# Patient Record
Sex: FEMALE | Race: WHITE | NOT HISPANIC OR LATINO | Employment: STUDENT | URBAN - METROPOLITAN AREA
[De-identification: names, ages, dates, MRNs, and addresses within clinical notes are randomized per-mention and may not be internally consistent; named-entity substitution may affect disease eponyms.]

---
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
## 2024-01-16 DIAGNOSIS — Z30.011 ENCOUNTER FOR INITIAL PRESCRIPTION OF CONTRACEPTIVE PILLS: ICD-10-CM

## 2024-01-16 RX ORDER — NORGESTIMATE AND ETHINYL ESTRADIOL 7DAYSX3 28
1 KIT ORAL DAILY
Qty: 84 TABLET | Refills: 3 | Status: SHIPPED | OUTPATIENT
Start: 2024-01-16 | End: 2025-01-15

## 2024-05-15 ENCOUNTER — PATIENT MESSAGE (OUTPATIENT)
Dept: PRIMARY CARE | Facility: CLINIC | Age: 19
End: 2024-05-15
Payer: COMMERCIAL

## 2024-05-15 DIAGNOSIS — B35.1 NAIL FUNGUS: ICD-10-CM

## 2024-05-16 RX ORDER — CICLOPIROX 80 MG/ML
SOLUTION TOPICAL NIGHTLY
Qty: 6.6 ML | Refills: 2 | Status: SHIPPED | OUTPATIENT
Start: 2024-05-16 | End: 2024-11-12

## 2024-07-09 ENCOUNTER — OFFICE VISIT (OUTPATIENT)
Dept: PODIATRY | Facility: CLINIC | Age: 19
End: 2024-07-09
Payer: COMMERCIAL

## 2024-07-09 DIAGNOSIS — B35.1 ONYCHOMYCOSIS: Primary | ICD-10-CM

## 2024-07-09 PROCEDURE — 1036F TOBACCO NON-USER: CPT | Performed by: PODIATRIST

## 2024-07-09 PROCEDURE — 99203 OFFICE O/P NEW LOW 30 MIN: CPT | Performed by: PODIATRIST

## 2024-07-09 RX ORDER — TERBINAFINE HYDROCHLORIDE 250 MG/1
250 TABLET ORAL DAILY
Qty: 30 TABLET | Refills: 2 | Status: SHIPPED | OUTPATIENT
Start: 2024-07-09 | End: 2024-07-09

## 2024-07-09 RX ORDER — TERBINAFINE HYDROCHLORIDE 250 MG/1
250 TABLET ORAL DAILY
Qty: 30 TABLET | Refills: 2 | Status: SHIPPED | OUTPATIENT
Start: 2024-07-09 | End: 2024-10-01

## 2024-07-09 NOTE — PROGRESS NOTES
This is a 18 y.o. female new patient for nail changes    History of Present Illness:   Patient states they are here for toe nail concern  States she tried topical with no improvement  Denies trauma  No other pedal complaints    Past Medical History  No past medical history on file.    Medications and Allergies have been reviewed.    Review Of Systems:  GENERAL: No weight loss, malaise or fevers.  HEENT: Negative for frequent or significant headaches,   RESPIRATORY: Negative for cough, wheezing or shortness of breath.  CARDIOVASCULAR: Negative for chest pain, leg swelling or palpitations.    Physical Exam:  Patient is a pleasant, cooperative, well developed 18 y.o.  adult female. The patient is alert and oriented to time, place and person.   Patient has normal affect and mood.    Examination of Both Lower Extremities:   Objective:   Vasc: DP and PT pulses are palpable bilateral.  CFT is less than 3 seconds bilateral.  Skin temperature is warm to cool proximal to distal bilateral.      Neuro: Vibratory, light touch and proprioception are intact bilateral.      Derm: BL 5th nail note thickness and discolor. Skin is supple with normal texture and turgor noted.  Webspaces are clean, dry and intact bilateral.  There are no hyperkeratoses, ulcerations, verruca or other lesions noted.      Ortho: Muscle strength is 5/5 for all pedal groups tested.   1. Onychomycosis  terbinafine (LamISIL) 250 mg tablet        Patient exam and eval  Discussed oral antifungal  Discussed success rate  No other pedal concerns  Fu prn if no improvement noted  Patient was in agreement to this plan. All questions answered.      Valeria West DPM  [phone]  [facility]  Fax: [phone]

## 2024-07-22 ENCOUNTER — PATIENT MESSAGE (OUTPATIENT)
Dept: PRIMARY CARE | Facility: CLINIC | Age: 19
End: 2024-07-22
Payer: COMMERCIAL

## 2024-07-22 DIAGNOSIS — R13.10 DYSPHAGIA, UNSPECIFIED TYPE: Primary | ICD-10-CM

## 2024-08-20 ENCOUNTER — APPOINTMENT (OUTPATIENT)
Dept: PODIATRY | Facility: CLINIC | Age: 19
End: 2024-08-20
Payer: COMMERCIAL

## 2024-09-13 ENCOUNTER — APPOINTMENT (OUTPATIENT)
Dept: GASTROENTEROLOGY | Facility: CLINIC | Age: 19
End: 2024-09-13
Payer: COMMERCIAL

## 2024-09-13 VITALS — HEART RATE: 86 BPM | BODY MASS INDEX: 24.11 KG/M2 | WEIGHT: 150 LBS | HEIGHT: 66 IN

## 2024-09-13 DIAGNOSIS — R13.10 DYSPHAGIA, UNSPECIFIED TYPE: ICD-10-CM

## 2024-09-13 PROCEDURE — 1036F TOBACCO NON-USER: CPT | Performed by: STUDENT IN AN ORGANIZED HEALTH CARE EDUCATION/TRAINING PROGRAM

## 2024-09-13 PROCEDURE — 99203 OFFICE O/P NEW LOW 30 MIN: CPT | Performed by: STUDENT IN AN ORGANIZED HEALTH CARE EDUCATION/TRAINING PROGRAM

## 2024-09-13 PROCEDURE — 3008F BODY MASS INDEX DOCD: CPT | Performed by: STUDENT IN AN ORGANIZED HEALTH CARE EDUCATION/TRAINING PROGRAM

## 2024-09-13 RX ORDER — PANTOPRAZOLE SODIUM 40 MG/1
40 TABLET, DELAYED RELEASE ORAL DAILY
Qty: 30 TABLET | Refills: 11 | Status: SHIPPED | OUTPATIENT
Start: 2024-09-13 | End: 2025-09-13

## 2024-09-13 ASSESSMENT — ENCOUNTER SYMPTOMS
ROS GI COMMENTS: DYSPHAGIA
ABDOMINAL PAIN: 0
JOINT SWELLING: 0
LIGHT-HEADEDNESS: 0
COUGH: 0
FEVER: 0
SLEEP DISTURBANCE: 0
CHILLS: 0
CONFUSION: 0
ABDOMINAL DISTENTION: 0
DIZZINESS: 0
SPEECH DIFFICULTY: 0
TROUBLE SWALLOWING: 0
CONSTIPATION: 0
VOMITING: 0
NAUSEA: 0
WHEEZING: 0
DIARRHEA: 0
HEADACHES: 0
UNEXPECTED WEIGHT CHANGE: 0
COLOR CHANGE: 0
SHORTNESS OF BREATH: 0
DIFFICULTY URINATING: 0
ARTHRALGIAS: 0

## 2024-09-13 NOTE — PROGRESS NOTES
"Chief Complaint:  Chief Complaint   Patient presents with    Dysphagia       Here with mother for dysphagia for past 4-5 years.    Daily like feels like food is slow to go down, pressure. Needs to chug water for food to pass.    Intermittent need to vomit or cough up food, every couple of months    Talk to PMD who prescribed a pill for acid reflux and took for 2-3 weeks but did not help so stopped.     No family hx  No allergies or eczema     Dysphagia  Pertinent negatives include no abdominal pain, arthralgias, chest pain, chills, congestion, coughing, fever, headaches, joint swelling, nausea or vomiting.         Review of Systems   Constitutional:  Negative for chills, fever and unexpected weight change.   HENT:  Negative for congestion and trouble swallowing.    Respiratory:  Negative for cough, shortness of breath and wheezing.    Cardiovascular:  Negative for chest pain.   Gastrointestinal:  Negative for abdominal distention, abdominal pain, constipation, diarrhea, nausea and vomiting.        Dysphagia   Genitourinary:  Negative for difficulty urinating.   Musculoskeletal:  Negative for arthralgias and joint swelling.   Skin:  Negative for color change.   Neurological:  Negative for dizziness, speech difficulty, light-headedness and headaches.   Psychiatric/Behavioral:  Negative for confusion and sleep disturbance.      No family history on file.    Medications    Current Outpatient Medications:     pantoprazole (ProtoNix) 40 mg EC tablet, Take 1 tablet (40 mg) by mouth once daily. Do not crush, chew, or split., Disp: 30 tablet, Rfl: 11    Vitals  Pulse 86   Ht 1.676 m (5' 6\")   Wt 68 kg (150 lb)   BMI 24.21 kg/m²     Physical Exam  Constitutional:       General: She is not in acute distress.  HENT:      Head: Normocephalic and atraumatic.   Eyes:      General: No scleral icterus.     Conjunctiva/sclera: Conjunctivae normal.   Cardiovascular:      Rate and Rhythm: Normal rate.      Heart sounds: Normal heart " "sounds.   Pulmonary:      Effort: Pulmonary effort is normal.      Breath sounds: No wheezing.   Abdominal:      General: Bowel sounds are normal. There is no distension.      Palpations: Abdomen is soft.      Tenderness: There is no abdominal tenderness. There is no guarding or rebound.      Hernia: No hernia is present.   Skin:     Coloration: Skin is not jaundiced.   Neurological:      Mental Status: She is alert and oriented to person, place, and time.   Psychiatric:         Mood and Affect: Mood normal.           Labs:  No results found for: \"AFP\"No results found for: \"ASMAB\", \"MITOAB\"No results found for: \"SHAVON\"No results found for: \"ASMAB\", \"MITOAB\"No results found for: \"DZMHXPCC94\"No results found for: \"HEPCAB\"No results found for: \"HEPATOT\", \"HEPAIGM\", \"HEPBCIGM\", \"HEPBCAB\", \"HBEAG\", \"HEPCAB\"No results found for: \"HIV1X2\"No results found for: \"IRON\", \"TIBC\", \"FERRITIN\"No results found for: \"INR\", \"PROTIME\"No results found for: \"TSH\"    Radiology  XR foot right 3+ views  PROCEDURE:         FOOT RT MIN 3 VIEW - LXR  0063  REASON FOR EXAM: M21.961 DEFORMITY OF RIGHT FOOT    RESULT: HISTORY: Deformity    TECHNIQUE: 3 views of the foot were obtained.    FINDINGS:    No fracture or dislocation is seen. No focal bony abnormality is apparent.    Lateral view does show a small focal area of soft tissue prominence along  the dorsal aspect of the foot in the tarsal region.    IMPRESSION: No acute finding.    Small soft tissue lump dorsal aspect of foot of uncertain etiology.  Follow-up MRI could be performed if indicated.    [id number]    This report has been produced using speech recognition.    Original Interpreting Physician:   NAVA MAKI M.D.  Original Transcribed by/Date: PSCB   Jun 17 2019  4:46P  Original Electronically Signed by/Date: NAVA MAKI M.D. Jun 17 2019  4:46P    Addendum Interpreting Physician:  Addendum Transcribed by/Date: NO ADDENDUM  Addendum Electronically Signed by/Date:      LETHA/EZEQUIEL Olsen " was seen today for dysphagia.  Diagnoses and all orders for this visit:  Dysphagia, unspecified type  -     Referral to Gastroenterology  -     pantoprazole (ProtoNix) 40 mg EC tablet; Take 1 tablet (40 mg) by mouth once daily. Do not crush, chew, or split.  -     Esophagogastroduodenoscopy (EGD); Future     Problem List Items Addressed This Visit             ICD-10-CM    Dysphagia R13.10     4-5 years of dysphagia to solids. Intermittent need for vomiting. Suspect EoE, recommend EGD with bx.   -Recommend EGD for furthe eval, discussed plan for esophageal bx and possible dilation   - R/B/A of procedure discussed w patient including but not limited to risk of bleeding, infection, perforation - patient agreeable to EGD   - no medications need adjusting   - start ppi qd   - pre and post procedure instructions discussed   - further tx based on EGD and review of biopsies  - counseled on avoiding meat like steak, pork chops, dry chicken for short term as these are typically the foods that get impacted  - patient with strong gag reflux, can use lidocaine spray or gargle if available         Relevant Medications    pantoprazole (ProtoNix) 40 mg EC tablet    Other Relevant Orders    Esophagogastroduodenoscopy (EGD)

## 2024-09-13 NOTE — ASSESSMENT & PLAN NOTE
4-5 years of dysphagia to solids. Intermittent need for vomiting. Suspect EoE, recommend EGD with bx.   -Recommend EGD for furthe eval, discussed plan for esophageal bx and possible dilation   - R/B/A of procedure discussed w patient including but not limited to risk of bleeding, infection, perforation - patient agreeable to EGD   - no medications need adjusting   - start ppi qd   - pre and post procedure instructions discussed   - further tx based on EGD and review of biopsies  - counseled on avoiding meat like steak, pork chops, dry chicken for short term as these are typically the foods that get impacted  - patient with strong gag reflux, can use lidocaine spray or gargle if available

## 2024-10-08 ENCOUNTER — APPOINTMENT (OUTPATIENT)
Dept: GASTROENTEROLOGY | Facility: EXTERNAL LOCATION | Age: 19
End: 2024-10-08
Payer: COMMERCIAL

## 2024-10-08 DIAGNOSIS — R13.10 DYSPHAGIA, UNSPECIFIED TYPE: ICD-10-CM

## 2024-10-08 DIAGNOSIS — K22.89 MUCOSAL ABNORMALITY OF ESOPHAGUS: Primary | ICD-10-CM

## 2024-10-08 PROCEDURE — 88305 TISSUE EXAM BY PATHOLOGIST: CPT

## 2024-10-08 PROCEDURE — 88305 TISSUE EXAM BY PATHOLOGIST: CPT | Performed by: STUDENT IN AN ORGANIZED HEALTH CARE EDUCATION/TRAINING PROGRAM

## 2024-10-08 PROCEDURE — 43239 EGD BIOPSY SINGLE/MULTIPLE: CPT | Performed by: STUDENT IN AN ORGANIZED HEALTH CARE EDUCATION/TRAINING PROGRAM

## 2024-10-09 ENCOUNTER — LAB REQUISITION (OUTPATIENT)
Dept: LAB | Facility: HOSPITAL | Age: 19
End: 2024-10-09
Payer: COMMERCIAL

## 2024-10-21 LAB
LABORATORY COMMENT REPORT: NORMAL
PATH REPORT.COMMENTS IMP SPEC: NORMAL
PATH REPORT.FINAL DX SPEC: NORMAL
PATH REPORT.GROSS SPEC: NORMAL
PATH REPORT.RELEVANT HX SPEC: NORMAL
PATH REPORT.TOTAL CANCER: NORMAL
RESIDENT REVIEW: NORMAL

## 2024-10-23 NOTE — RESULT ENCOUNTER NOTE
Bx from esophagus not diagnostic for EoE but given lamina propria fibrosis and characteristic esophageal mucosal changes will assume EoE and treat accordingly. Continue PPI and f.up as planned in December

## 2024-11-19 DIAGNOSIS — R13.10 DYSPHAGIA, UNSPECIFIED TYPE: ICD-10-CM

## 2024-11-19 RX ORDER — PANTOPRAZOLE SODIUM 40 MG/1
40 TABLET, DELAYED RELEASE ORAL DAILY
Qty: 90 TABLET | Refills: 2 | Status: SHIPPED | OUTPATIENT
Start: 2024-11-19

## 2024-12-17 ENCOUNTER — APPOINTMENT (OUTPATIENT)
Dept: GASTROENTEROLOGY | Facility: CLINIC | Age: 19
End: 2024-12-17
Payer: COMMERCIAL

## 2025-02-28 ENCOUNTER — APPOINTMENT (OUTPATIENT)
Facility: CLINIC | Age: 20
End: 2025-02-28
Payer: COMMERCIAL

## 2025-02-28 VITALS — HEIGHT: 66 IN | HEART RATE: 80 BPM | WEIGHT: 150 LBS | BODY MASS INDEX: 24.11 KG/M2

## 2025-02-28 DIAGNOSIS — K20.0 EOSINOPHILIC ESOPHAGITIS: Primary | ICD-10-CM

## 2025-02-28 DIAGNOSIS — R13.10 DYSPHAGIA, UNSPECIFIED TYPE: ICD-10-CM

## 2025-02-28 PROCEDURE — 1036F TOBACCO NON-USER: CPT | Performed by: STUDENT IN AN ORGANIZED HEALTH CARE EDUCATION/TRAINING PROGRAM

## 2025-02-28 PROCEDURE — 99213 OFFICE O/P EST LOW 20 MIN: CPT | Performed by: STUDENT IN AN ORGANIZED HEALTH CARE EDUCATION/TRAINING PROGRAM

## 2025-02-28 PROCEDURE — 3008F BODY MASS INDEX DOCD: CPT | Performed by: STUDENT IN AN ORGANIZED HEALTH CARE EDUCATION/TRAINING PROGRAM

## 2025-02-28 RX ORDER — PANTOPRAZOLE SODIUM 40 MG/1
40 TABLET, DELAYED RELEASE ORAL DAILY
Qty: 90 TABLET | Refills: 2 | Status: SHIPPED | OUTPATIENT
Start: 2025-02-28

## 2025-02-28 ASSESSMENT — ENCOUNTER SYMPTOMS
TROUBLE SWALLOWING: 0
ANAL BLEEDING: 0
COLOR CHANGE: 0
BLOOD IN STOOL: 0
ABDOMINAL PAIN: 0
UNEXPECTED WEIGHT CHANGE: 0
FEVER: 0
SHORTNESS OF BREATH: 0
VOMITING: 0
CONSTIPATION: 0
CHILLS: 0
DIARRHEA: 0
ABDOMINAL DISTENTION: 0
NAUSEA: 0
RECTAL PAIN: 0

## 2025-02-28 NOTE — PROGRESS NOTES
"Chief Complaint:  Chief Complaint   Patient presents with    Dysphagia       Patient is seen for follow-up of dysphagia with EGD done 10/2024 w changes consistent with EoE, EREFS 3/4.  Bx with 6-8 EoE/HPF but with lamina propria fibrosis.     On pantoprazole 40mg every day. Rare occasions she forgets to take. Takes most days     Eating normally, no dysphagia, no food impaction.     EGD 10/2024  A. ESOPHAGUS, DISTAL, BIOPSY:  - Squamous mucosa with reactive epithelial changes and occasional intraepithelial eosinophils (focally up to 6 per high-power field) (see comment).  - Lamina propria fibrosis.  - Negative for intestinal metaplasia.     B. ESOPHAGUS, PROXIMAL, BIOPSY:  - Squamous mucosa with reactive epithelial changes and occasional intraepithelial eosinophils (focally up to 8 per high-power field) (see comment).  - Lamina propria fibrosis.         Dysphagia  Pertinent negatives include no abdominal pain, chest pain, chills, fever, nausea or vomiting.               Review of Systems   Constitutional:  Negative for chills, fever and unexpected weight change.   HENT:  Negative for trouble swallowing.    Respiratory:  Negative for shortness of breath.    Cardiovascular:  Negative for chest pain.   Gastrointestinal:  Negative for abdominal distention, abdominal pain, anal bleeding, blood in stool, constipation, diarrhea, nausea, rectal pain and vomiting.   Skin:  Negative for color change.     No family history on file.    Medications    Current Outpatient Medications:     pantoprazole (ProtoNix) 40 mg EC tablet, Take 1 tablet (40 mg) by mouth once daily. Do not crush, chew, or split., Disp: 90 tablet, Rfl: 2    Vitals  Pulse 80   Ht 1.676 m (5' 6\")   Wt 68 kg (150 lb)   BMI 24.21 kg/m²     Physical Exam  Constitutional:       General: She is not in acute distress.  HENT:      Head: Normocephalic and atraumatic.   Eyes:      General: No scleral icterus.     Conjunctiva/sclera: Conjunctivae normal. " "  Cardiovascular:      Rate and Rhythm: Normal rate.      Heart sounds: Normal heart sounds.   Pulmonary:      Effort: Pulmonary effort is normal.      Breath sounds: No wheezing.   Abdominal:      General: Bowel sounds are normal. There is no distension.      Palpations: Abdomen is soft.   Skin:     General: Skin is warm.      Coloration: Skin is not jaundiced.   Neurological:      General: No focal deficit present.      Mental Status: She is alert and oriented to person, place, and time.   Psychiatric:         Mood and Affect: Mood normal.           Labs:  No results found for: \"AFP\"No results found for: \"ASMAB\", \"MITOAB\"No results found for: \"SHAVON\"No results found for: \"ASMAB\", \"MITOAB\"No results found for: \"[id number]\"No results found for: \"HEPCAB\"No results found for: \"HEPATOT\", \"HEPAIGM\", \"HEPBCIGM\", \"HEPBCAB\", \"HBEAG\", \"HEPCAB\"No results found for: \"HIV1X2\"No results found for: \"IRON\", \"TIBC\", \"FERRITIN\"No results found for: \"INR\", \"PROTIME\"No results found for: \"TSH\"    Radiology      No image results found.      A/P   Pretty was seen today for dysphagia.  Diagnoses and all orders for this visit:  Eosinophilic esophagitis (Primary)  Dysphagia, unspecified type  -     Follow Up In Gastroenterology  -     pantoprazole (ProtoNix) 40 mg EC tablet; Take 1 tablet (40 mg) by mouth once daily. Do not crush, chew, or split.  -     Esophagogastroduodenoscopy (EGD); Future  -     Follow Up In Gastroenterology; Future     Problem List Items Addressed This Visit             ICD-10-CM    Dysphagia R13.10    Relevant Medications    pantoprazole (ProtoNix) 40 mg EC tablet    Other Relevant Orders    Esophagogastroduodenoscopy (EGD)    Follow Up In Gastroenterology    Eosinophilic esophagitis - Primary K20.0     New diagnosis of EoE. EGD done 10/2024 with circumferentials rings but no strictures. EREFS 3-4.  Bx with 6-8 EoE in prox and distal esophagus. Symptoms resolved on PPI 40mg every day   - Discussed diagnosis of EoE, " pathophysiology and management. Discussed chronic nature of EoE and need for continued treatment or risk of recurrence of symptoms ro worsening of diease. We discussed management with PPI, oral steroids, diet elimination with 6 food elimation, step up or down approach, and FDA approved medication Dupixent.  - given good response with pantoprazole will c/w every day  - rpt egd on next school break to eval for mucosal improvement and re-biopsy  - call if worsening sxs, can rpt egd prn   RTC 1 yr

## 2025-02-28 NOTE — ASSESSMENT & PLAN NOTE
New diagnosis of EoE. EGD done 10/2024 with circumferentials rings but no strictures. EREFS 3-4.  Bx with 6-8 EoE in prox and distal esophagus. Symptoms resolved on PPI 40mg every day   - Discussed diagnosis of EoE, pathophysiology and management. Discussed chronic nature of EoE and need for continued treatment or risk of recurrence of symptoms ro worsening of diease. We discussed management with PPI, oral steroids, diet elimination with 6 food elimation, step up or down approach, and FDA approved medication Dupixent.  - given good response with pantoprazole will c/w every day  - rpt egd on next school break to eval for mucosal improvement and re-biopsy  - call if worsening sxs, can rpt egd prn   RTC 1 yr

## 2025-05-06 ENCOUNTER — APPOINTMENT (OUTPATIENT)
Dept: GASTROENTEROLOGY | Facility: EXTERNAL LOCATION | Age: 20
End: 2025-05-06
Payer: COMMERCIAL

## 2025-08-12 ENCOUNTER — APPOINTMENT (OUTPATIENT)
Dept: OBSTETRICS AND GYNECOLOGY | Facility: CLINIC | Age: 20
End: 2025-08-12
Payer: COMMERCIAL

## 2025-08-21 ENCOUNTER — OFFICE VISIT (OUTPATIENT)
Dept: DERMATOLOGY | Facility: CLINIC | Age: 20
End: 2025-08-21
Payer: COMMERCIAL

## 2025-08-21 ENCOUNTER — APPOINTMENT (OUTPATIENT)
Dept: OBSTETRICS AND GYNECOLOGY | Facility: CLINIC | Age: 20
End: 2025-08-21
Payer: COMMERCIAL

## 2025-08-21 VITALS
DIASTOLIC BLOOD PRESSURE: 77 MMHG | BODY MASS INDEX: 25.23 KG/M2 | SYSTOLIC BLOOD PRESSURE: 116 MMHG | WEIGHT: 157 LBS | HEIGHT: 66 IN

## 2025-08-21 DIAGNOSIS — D22.9 NEVUS: Primary | ICD-10-CM

## 2025-08-21 DIAGNOSIS — Z01.419 WELL WOMAN EXAM WITH ROUTINE GYNECOLOGICAL EXAM: Primary | ICD-10-CM

## 2025-08-21 DIAGNOSIS — D23.9 DERMATOFIBROMA: ICD-10-CM

## 2025-08-21 DIAGNOSIS — Z12.83 SCREENING EXAM FOR SKIN CANCER: ICD-10-CM

## 2025-08-21 PROCEDURE — 99203 OFFICE O/P NEW LOW 30 MIN: CPT | Performed by: NURSE PRACTITIONER

## 2025-08-21 PROCEDURE — 3008F BODY MASS INDEX DOCD: CPT | Performed by: OBSTETRICS & GYNECOLOGY

## 2025-08-21 PROCEDURE — 99385 PREV VISIT NEW AGE 18-39: CPT | Performed by: OBSTETRICS & GYNECOLOGY

## 2025-08-21 PROCEDURE — 1036F TOBACCO NON-USER: CPT | Performed by: NURSE PRACTITIONER

## 2025-08-21 PROCEDURE — 1036F TOBACCO NON-USER: CPT | Performed by: OBSTETRICS & GYNECOLOGY

## 2025-08-21 SDOH — ECONOMIC STABILITY: FOOD INSECURITY: WITHIN THE PAST 12 MONTHS, YOU WORRIED THAT YOUR FOOD WOULD RUN OUT BEFORE YOU GOT MONEY TO BUY MORE.: NEVER TRUE

## 2025-08-21 SDOH — ECONOMIC STABILITY: TRANSPORTATION INSECURITY
IN THE PAST 12 MONTHS, HAS THE LACK OF TRANSPORTATION KEPT YOU FROM MEDICAL APPOINTMENTS OR FROM GETTING MEDICATIONS?: NO

## 2025-08-21 SDOH — ECONOMIC STABILITY: FOOD INSECURITY: WITHIN THE PAST 12 MONTHS, THE FOOD YOU BOUGHT JUST DIDN'T LAST AND YOU DIDN'T HAVE MONEY TO GET MORE.: NEVER TRUE

## 2025-08-21 SDOH — ECONOMIC STABILITY: INCOME INSECURITY: IN THE LAST 12 MONTHS, WAS THERE A TIME WHEN YOU WERE NOT ABLE TO PAY THE MORTGAGE OR RENT ON TIME?: NO

## 2025-08-21 SDOH — ECONOMIC STABILITY: TRANSPORTATION INSECURITY
IN THE PAST 12 MONTHS, HAS LACK OF TRANSPORTATION KEPT YOU FROM MEETINGS, WORK, OR FROM GETTING THINGS NEEDED FOR DAILY LIVING?: NO

## 2025-08-21 ASSESSMENT — SOCIAL DETERMINANTS OF HEALTH (SDOH)
HOW HARD IS IT FOR YOU TO PAY FOR THE VERY BASICS LIKE FOOD, HOUSING, MEDICAL CARE, AND HEATING?: NOT HARD AT ALL
WITHIN THE LAST YEAR, HAVE YOU BEEN KICKED, HIT, SLAPPED, OR OTHERWISE PHYSICALLY HURT BY YOUR PARTNER OR EX-PARTNER?: NO
WITHIN THE LAST YEAR, HAVE YOU BEEN AFRAID OF YOUR PARTNER OR EX-PARTNER?: NO
WITHIN THE LAST YEAR, HAVE TO BEEN RAPED OR FORCED TO HAVE ANY KIND OF SEXUAL ACTIVITY BY YOUR PARTNER OR EX-PARTNER?: NO
WITHIN THE LAST YEAR, HAVE YOU BEEN HUMILIATED OR EMOTIONALLY ABUSED IN OTHER WAYS BY YOUR PARTNER OR EX-PARTNER?: NO

## 2025-08-21 ASSESSMENT — DERMATOLOGY QUALITY OF LIFE (QOL) ASSESSMENT
WHAT SINGLE SKIN CONDITION LISTED BELOW IS THE PATIENT ANSWERING THE QUALITY-OF-LIFE ASSESSMENT QUESTIONS ABOUT: ACNE
RATE HOW EMOTIONALLY BOTHERED YOU ARE BY YOUR SKIN PROBLEM (FOR EXAMPLE, WORRY, EMBARRASSMENT, FRUSTRATION): 4
RATE HOW BOTHERED YOU ARE BY EFFECTS OF YOUR SKIN PROBLEMS ON YOUR ACTIVITIES (EG, GOING OUT, ACCOMPLISHING WHAT YOU WANT, WORK ACTIVITIES OR YOUR RELATIONSHIPS WITH OTHERS): 0 - NEVER BOTHERED
RATE HOW BOTHERED YOU ARE BY EFFECTS OF YOUR SKIN PROBLEMS ON YOUR ACTIVITIES (EG, GOING OUT, ACCOMPLISHING WHAT YOU WANT, WORK ACTIVITIES OR YOUR RELATIONSHIPS WITH OTHERS): 0 - NEVER BOTHERED
RATE HOW BOTHERED YOU ARE BY SYMPTOMS OF YOUR SKIN PROBLEM (EG, ITCHING, STINGING BURNING, HURTING OR SKIN IRRITATION): 0 - NEVER BOTHERED
WHAT SINGLE SKIN CONDITION LISTED BELOW IS THE PATIENT ANSWERING THE QUALITY-OF-LIFE ASSESSMENT QUESTIONS ABOUT: ACNE
RATE HOW EMOTIONALLY BOTHERED YOU ARE BY YOUR SKIN PROBLEM (FOR EXAMPLE, WORRY, EMBARRASSMENT, FRUSTRATION): 4
RATE HOW BOTHERED YOU ARE BY SYMPTOMS OF YOUR SKIN PROBLEM (EG, ITCHING, STINGING BURNING, HURTING OR SKIN IRRITATION): 0 - NEVER BOTHERED
DATE THE QUALITY-OF-LIFE ASSESSMENT WAS COMPLETED: 67438

## 2025-08-21 ASSESSMENT — LIFESTYLE VARIABLES
HOW OFTEN DO YOU HAVE A DRINK CONTAINING ALCOHOL: MONTHLY OR LESS
HOW MANY STANDARD DRINKS CONTAINING ALCOHOL DO YOU HAVE ON A TYPICAL DAY: 1 OR 2
SKIP TO QUESTIONS 9-10: 1
AUDIT-C TOTAL SCORE: 1
HOW OFTEN DO YOU HAVE SIX OR MORE DRINKS ON ONE OCCASION: NEVER

## 2025-08-21 ASSESSMENT — PATIENT GLOBAL ASSESSMENT (PGA): WHAT IS THE PGA: PATIENT GLOBAL ASSESSMENT:  1 - CLEAR

## 2025-09-30 ENCOUNTER — APPOINTMENT (OUTPATIENT)
Dept: DERMATOLOGY | Facility: CLINIC | Age: 20
End: 2025-09-30
Payer: COMMERCIAL

## 2025-12-16 ENCOUNTER — APPOINTMENT (OUTPATIENT)
Dept: GASTROENTEROLOGY | Facility: EXTERNAL LOCATION | Age: 20
End: 2025-12-16
Payer: COMMERCIAL